# Patient Record
Sex: FEMALE | Race: WHITE | NOT HISPANIC OR LATINO | URBAN - METROPOLITAN AREA
[De-identification: names, ages, dates, MRNs, and addresses within clinical notes are randomized per-mention and may not be internally consistent; named-entity substitution may affect disease eponyms.]

---

## 2023-07-14 ENCOUNTER — OFFICE VISIT (OUTPATIENT)
Dept: FAMILY MEDICINE | Facility: CLINIC | Age: 78
End: 2023-07-14
Payer: MEDICARE

## 2023-07-14 VITALS
RESPIRATION RATE: 18 BRPM | OXYGEN SATURATION: 97 % | DIASTOLIC BLOOD PRESSURE: 81 MMHG | SYSTOLIC BLOOD PRESSURE: 130 MMHG | TEMPERATURE: 99 F | WEIGHT: 190 LBS | HEART RATE: 64 BPM

## 2023-07-14 DIAGNOSIS — R10.2 VAGINAL PAIN: Primary | ICD-10-CM

## 2023-07-14 PROBLEM — N28.89 RENAL MASS: Status: ACTIVE | Noted: 2021-01-18

## 2023-07-14 PROBLEM — I12.9 HYPERTENSIVE KIDNEY DISEASE, STAGE III (H): Status: ACTIVE | Noted: 2018-09-27

## 2023-07-14 PROBLEM — Z96.1 PSEUDOPHAKIA, BOTH EYES: Status: ACTIVE | Noted: 2018-04-04

## 2023-07-14 PROBLEM — N18.30 HYPERTENSIVE KIDNEY DISEASE, STAGE III (H): Status: ACTIVE | Noted: 2018-09-27

## 2023-07-14 PROBLEM — R41.3 AMNESIA: Status: ACTIVE | Noted: 2021-04-12

## 2023-07-14 PROBLEM — M79.18 MUSCLE PAIN, MYOFASCIAL: Status: ACTIVE | Noted: 2019-01-08

## 2023-07-14 PROBLEM — G60.9 IDIOPATHIC PERIPHERAL NEUROPATHY: Status: ACTIVE | Noted: 2019-04-15

## 2023-07-14 PROBLEM — R19.7 DIARRHEA: Status: ACTIVE | Noted: 2021-02-02

## 2023-07-14 PROBLEM — F41.9 ANXIETY: Status: ACTIVE | Noted: 2021-04-12

## 2023-07-14 PROBLEM — M19.011 ARTHRITIS OF RIGHT SHOULDER REGION: Status: ACTIVE | Noted: 2019-01-08

## 2023-07-14 PROBLEM — Z85.528 PERSONAL HISTORY OF OTHER MALIGNANT NEOPLASM OF KIDNEY: Status: ACTIVE | Noted: 2021-06-02

## 2023-07-14 PROBLEM — Z91.419 HISTORY OF ABUSE IN ADULTHOOD: Status: ACTIVE | Noted: 2019-04-15

## 2023-07-14 PROBLEM — M54.32 SCIATICA, LEFT SIDE: Status: ACTIVE | Noted: 2023-01-05

## 2023-07-14 PROBLEM — M19.019 OSTEOARTHRITIS OF SHOULDER: Status: ACTIVE | Noted: 2019-01-08

## 2023-07-14 PROBLEM — M79.605 PAIN OF LEFT LOWER EXTREMITY: Status: ACTIVE | Noted: 2023-01-05

## 2023-07-14 PROBLEM — G31.84 MINIMAL COGNITIVE IMPAIRMENT: Status: ACTIVE | Noted: 2021-04-12

## 2023-07-14 PROBLEM — E78.00 HYPERCHOLESTEROLEMIA: Status: ACTIVE | Noted: 2022-02-02

## 2023-07-14 PROBLEM — M19.039 ARTHRITIS OF WRIST: Status: ACTIVE | Noted: 2022-03-24

## 2023-07-14 PROBLEM — R26.89 IMPAIRMENT OF BALANCE: Status: ACTIVE | Noted: 2021-06-15

## 2023-07-14 PROBLEM — J45.909 ASTHMA: Status: ACTIVE | Noted: 2023-06-06

## 2023-07-14 PROBLEM — M53.9 DORSOPATHY, UNSPECIFIED: Status: ACTIVE | Noted: 2023-01-05

## 2023-07-14 PROBLEM — C64.9 MALIGNANT NEOPLASM OF KIDNEY (H): Status: ACTIVE | Noted: 2021-06-02

## 2023-07-14 LAB
ALBUMIN UR-MCNC: NEGATIVE MG/DL
APPEARANCE UR: CLEAR
BACTERIA #/AREA URNS HPF: ABNORMAL /HPF
BILIRUB UR QL STRIP: NEGATIVE
COLOR UR AUTO: YELLOW
GLUCOSE UR STRIP-MCNC: NEGATIVE MG/DL
HGB UR QL STRIP: ABNORMAL
KETONES UR STRIP-MCNC: NEGATIVE MG/DL
LEUKOCYTE ESTERASE UR QL STRIP: NEGATIVE
NITRATE UR QL: NEGATIVE
PH UR STRIP: 6 [PH] (ref 5–8)
RBC #/AREA URNS AUTO: ABNORMAL /HPF
SP GR UR STRIP: <=1.005 (ref 1–1.03)
SQUAMOUS #/AREA URNS AUTO: ABNORMAL /LPF
UROBILINOGEN UR STRIP-ACNC: 0.2 E.U./DL
WBC #/AREA URNS AUTO: ABNORMAL /HPF

## 2023-07-14 PROCEDURE — 99203 OFFICE O/P NEW LOW 30 MIN: CPT | Performed by: STUDENT IN AN ORGANIZED HEALTH CARE EDUCATION/TRAINING PROGRAM

## 2023-07-14 PROCEDURE — 81001 URINALYSIS AUTO W/SCOPE: CPT | Performed by: STUDENT IN AN ORGANIZED HEALTH CARE EDUCATION/TRAINING PROGRAM

## 2023-07-14 RX ORDER — ALBUTEROL SULFATE 90 UG/1
2 AEROSOL, METERED RESPIRATORY (INHALATION)
COMMUNITY
Start: 2023-06-14

## 2023-07-14 RX ORDER — ATORVASTATIN CALCIUM 20 MG/1
1 TABLET, FILM COATED ORAL EVERY MORNING
COMMUNITY
Start: 2023-05-27

## 2023-07-14 RX ORDER — MULTIVITAMIN
1 TABLET ORAL
COMMUNITY

## 2023-07-14 RX ORDER — METOPROLOL SUCCINATE 25 MG/1
12.5 TABLET, EXTENDED RELEASE ORAL
COMMUNITY
Start: 2023-05-27

## 2023-07-14 RX ORDER — OYSTER SHELL CALCIUM WITH VITAMIN D 500; 200 MG/1; [IU]/1
TABLET, FILM COATED ORAL DAILY
COMMUNITY

## 2023-07-14 NOTE — PROGRESS NOTES
"Assessment & Plan     Vaginal pain  77-year-old female with 1 day of vaginal stinging, no fever, suprapubic pain, increased urgency or frequency, vaginal discharge.  Last sexual activity was 1 month ago with a male partner.  Urinalysis without signs of infection.  Exam c/w postmenopausal genitalia with thin, friable vaginal mucosa, no rash or discharge.  Most likely micro tear of genital tissue that will self resolve.  Provided education on postmenopausal estrogen cream to discuss with PCP and water-based lubrication to use with all penetrative sexual intercourse.  Questions asked and answered.  - UA Macroscopic with reflex to Microscopic and Culture - Clinic Collect  - UA Microscopic with Reflex to Culture    Return for if symptoms do not improve in 3-5 days.    Nadia Cain, DO  she/her  [facility]    Subjective     Chayito Álvarez is a 77 year old female who presents to clinic today for the following health issues:    HPI    Genital  Onset of symptoms was 1day(s).  Course of illness is worsening  Severity moderate  Current and associated: \"stinging\" but not painful to urinate  No fever, chills, vomiting, suprapubic pain, fever, chills, nausea, vomiting  Had some back pain last week but was moving a heavy box  Treatment and measures tried OTC tylenol last night  Has had UTI before but was a while ago  Predisposing factors include none  Patient denies rigors, flank pain, temperature > 101 degrees F., vomiting, vaginal odor and vaginal itching    Last sexual activity last month, with a male partner     History reviewed. No pertinent past medical history.    Allergies   Allergen Reactions     Gluten Meal Nausea and Vomiting and Other (See Comments)     Abdominal/Bowel Intolerance  Abdominal/Bowel Intolerance       Sulfa Antibiotics Cough, Hives, Rash and Swelling     Happened 30 years ago per patient.   Other reaction(s): Cough  Happened 30 years ago per patient.        Current Outpatient " Medications   Medication     albuterol (PROAIR HFA/PROVENTIL HFA/VENTOLIN HFA) 108 (90 Base) MCG/ACT inhaler     atorvastatin (LIPITOR) 20 MG tablet     Calcium Carbonate-Vitamin D 500-5 MG-MCG TABS     metoprolol succinate ER (TOPROL XL) 25 MG 24 hr tablet     Multiple Vitamin (DAILY VITES) TABS     No current facility-administered medications for this visit.      Review of Systems  Constitutional, HEENT, cardiovascular, pulmonary, gi and gu systems are negative, except as otherwise noted.      Objective    /81 (BP Location: Right arm, Patient Position: Sitting, Cuff Size: Adult Regular)   Pulse 64   Temp 99  F (37.2  C) (Oral)   Resp 18   Wt 86.2 kg (190 lb)   SpO2 97%      Physical Exam   General: Alert and oriented, in no acute distress.  Eyes: Extra-ocular muscles intact, pupils equal and reactive.  ENT: Speech intact, nasal passages open, no hearing impairment noted.  CV: No cyanosis or pallor, warm and well perfused.  Respiratory: No respiratory distress, no accessory muscle use.  Neuro: Gait and station normal, comprehension intact. Gross and fine motor skills intact.   Psychiatric: Mood and affect appear normal.   Extremities: Warm, able to move all four extremities at will.  GENERAL: healthy, alert and no distress   (female): normal urethral meatus, no rash or lesion, + vulvar atrophy with less mucosal architecture around the entroitus    Results for orders placed or performed in visit on 07/14/23 (from the past 24 hour(s))   UA Macroscopic with reflex to Microscopic and Culture - Clinic Collect    Specimen: Urine, Clean Catch   Result Value Ref Range    Color Urine Yellow Colorless, Straw, Light Yellow, Yellow    Appearance Urine Clear Clear    Glucose Urine Negative Negative mg/dL    Bilirubin Urine Negative Negative    Ketones Urine Negative Negative mg/dL    Specific Gravity Urine <=1.005 1.005 - 1.030    Blood Urine Trace (A) Negative    pH Urine 6.0 5.0 - 8.0    Protein Albumin Urine  Negative Negative mg/dL    Urobilinogen Urine 0.2 0.2, 1.0 E.U./dL    Nitrite Urine Negative Negative    Leukocyte Esterase Urine Negative Negative   UA Microscopic with Reflex to Culture   Result Value Ref Range    Bacteria Urine None Seen None Seen /HPF    RBC Urine 0-2 0-2 /HPF /HPF    WBC Urine 0-5 0-5 /HPF /HPF    Squamous Epithelials Urine Few (A) None Seen /LPF    Narrative    Urine Culture not indicated           The use of Dragon[employer] dictation services may have been used to construct the content in this note; any grammatical or spelling errors are non-intentional. Please contact the author of this note directly if you are in need of any clarification.

## 2023-07-14 NOTE — PATIENT INSTRUCTIONS
The urinalysis did not show any signs of infection.  We will call you if the urine ends up growing bacteria in the next 2 days.    The stinging is most likely secondary to microtrauma's around your vagina, which will heal on its own.  I recommend that you discuss vaginal estrogen cream with your primary care doctor and always use a water-based lubrication when you are having vaginal intercourse.